# Patient Record
Sex: MALE | Race: WHITE | NOT HISPANIC OR LATINO | URBAN - METROPOLITAN AREA
[De-identification: names, ages, dates, MRNs, and addresses within clinical notes are randomized per-mention and may not be internally consistent; named-entity substitution may affect disease eponyms.]

---

## 2022-09-08 ENCOUNTER — INPATIENT (INPATIENT)
Facility: HOSPITAL | Age: 67
LOS: 0 days | Discharge: LEFT BEFORE DISCHARGE | DRG: 69 | End: 2022-09-09
Attending: PSYCHIATRY & NEUROLOGY | Admitting: PSYCHIATRY & NEUROLOGY
Payer: MEDICARE

## 2022-09-08 VITALS
RESPIRATION RATE: 16 BRPM | DIASTOLIC BLOOD PRESSURE: 97 MMHG | WEIGHT: 152.56 LBS | HEART RATE: 96 BPM | TEMPERATURE: 98 F | OXYGEN SATURATION: 97 % | SYSTOLIC BLOOD PRESSURE: 172 MMHG

## 2022-09-08 LAB
ALBUMIN SERPL ELPH-MCNC: 4.5 G/DL — SIGNIFICANT CHANGE UP (ref 3.3–5)
ALP SERPL-CCNC: 101 U/L — SIGNIFICANT CHANGE UP (ref 40–120)
ALT FLD-CCNC: 27 U/L — SIGNIFICANT CHANGE UP (ref 10–45)
ANION GAP SERPL CALC-SCNC: 16 MMOL/L — SIGNIFICANT CHANGE UP (ref 5–17)
APPEARANCE UR: CLEAR — SIGNIFICANT CHANGE UP
APTT BLD: 32.9 SEC — SIGNIFICANT CHANGE UP (ref 27.5–35.5)
AST SERPL-CCNC: 24 U/L — SIGNIFICANT CHANGE UP (ref 10–40)
BASOPHILS # BLD AUTO: 0.06 K/UL — SIGNIFICANT CHANGE UP (ref 0–0.2)
BASOPHILS NFR BLD AUTO: 1.1 % — SIGNIFICANT CHANGE UP (ref 0–2)
BILIRUB SERPL-MCNC: 0.4 MG/DL — SIGNIFICANT CHANGE UP (ref 0.2–1.2)
BILIRUB UR-MCNC: NEGATIVE — SIGNIFICANT CHANGE UP
BUN SERPL-MCNC: 17 MG/DL — SIGNIFICANT CHANGE UP (ref 7–23)
CALCIUM SERPL-MCNC: 9.7 MG/DL — SIGNIFICANT CHANGE UP (ref 8.4–10.5)
CHLORIDE SERPL-SCNC: 106 MMOL/L — SIGNIFICANT CHANGE UP (ref 96–108)
CO2 SERPL-SCNC: 21 MMOL/L — LOW (ref 22–31)
COLOR SPEC: YELLOW — SIGNIFICANT CHANGE UP
CREAT SERPL-MCNC: 0.9 MG/DL — SIGNIFICANT CHANGE UP (ref 0.5–1.3)
DIFF PNL FLD: NEGATIVE — SIGNIFICANT CHANGE UP
EGFR: 92 ML/MIN/1.73M2 — SIGNIFICANT CHANGE UP
EOSINOPHIL # BLD AUTO: 0.32 K/UL — SIGNIFICANT CHANGE UP (ref 0–0.5)
EOSINOPHIL NFR BLD AUTO: 6.1 % — HIGH (ref 0–6)
GLUCOSE SERPL-MCNC: 228 MG/DL — HIGH (ref 70–99)
GLUCOSE UR QL: >=1000
HCT VFR BLD CALC: 47.3 % — SIGNIFICANT CHANGE UP (ref 39–50)
HGB BLD-MCNC: 16 G/DL — SIGNIFICANT CHANGE UP (ref 13–17)
IMM GRANULOCYTES NFR BLD AUTO: 0.2 % — SIGNIFICANT CHANGE UP (ref 0–1.5)
INR BLD: 0.97 — SIGNIFICANT CHANGE UP (ref 0.88–1.16)
KETONES UR-MCNC: NEGATIVE — SIGNIFICANT CHANGE UP
LEUKOCYTE ESTERASE UR-ACNC: NEGATIVE — SIGNIFICANT CHANGE UP
LYMPHOCYTES # BLD AUTO: 1.38 K/UL — SIGNIFICANT CHANGE UP (ref 1–3.3)
LYMPHOCYTES # BLD AUTO: 26.4 % — SIGNIFICANT CHANGE UP (ref 13–44)
MCHC RBC-ENTMCNC: 30.3 PG — SIGNIFICANT CHANGE UP (ref 27–34)
MCHC RBC-ENTMCNC: 33.8 GM/DL — SIGNIFICANT CHANGE UP (ref 32–36)
MCV RBC AUTO: 89.6 FL — SIGNIFICANT CHANGE UP (ref 80–100)
MONOCYTES # BLD AUTO: 0.3 K/UL — SIGNIFICANT CHANGE UP (ref 0–0.9)
MONOCYTES NFR BLD AUTO: 5.7 % — SIGNIFICANT CHANGE UP (ref 2–14)
NEUTROPHILS # BLD AUTO: 3.15 K/UL — SIGNIFICANT CHANGE UP (ref 1.8–7.4)
NEUTROPHILS NFR BLD AUTO: 60.5 % — SIGNIFICANT CHANGE UP (ref 43–77)
NITRITE UR-MCNC: NEGATIVE — SIGNIFICANT CHANGE UP
NRBC # BLD: 0 /100 WBCS — SIGNIFICANT CHANGE UP (ref 0–0)
PH UR: 7 — SIGNIFICANT CHANGE UP (ref 5–8)
PLATELET # BLD AUTO: 305 K/UL — SIGNIFICANT CHANGE UP (ref 150–400)
POTASSIUM SERPL-MCNC: 3.4 MMOL/L — LOW (ref 3.5–5.3)
POTASSIUM SERPL-SCNC: 3.4 MMOL/L — LOW (ref 3.5–5.3)
PROT SERPL-MCNC: 7.1 G/DL — SIGNIFICANT CHANGE UP (ref 6–8.3)
PROT UR-MCNC: NEGATIVE MG/DL — SIGNIFICANT CHANGE UP
PROTHROM AB SERPL-ACNC: 11.5 SEC — SIGNIFICANT CHANGE UP (ref 10.5–13.4)
RBC # BLD: 5.28 M/UL — SIGNIFICANT CHANGE UP (ref 4.2–5.8)
RBC # FLD: 13 % — SIGNIFICANT CHANGE UP (ref 10.3–14.5)
SARS-COV-2 RNA SPEC QL NAA+PROBE: NEGATIVE — SIGNIFICANT CHANGE UP
SODIUM SERPL-SCNC: 143 MMOL/L — SIGNIFICANT CHANGE UP (ref 135–145)
SP GR SPEC: 1.01 — SIGNIFICANT CHANGE UP (ref 1–1.03)
TROPONIN T SERPL-MCNC: 0.01 NG/ML — SIGNIFICANT CHANGE UP (ref 0–0.01)
UROBILINOGEN FLD QL: 0.2 E.U./DL — SIGNIFICANT CHANGE UP
WBC # BLD: 5.22 K/UL — SIGNIFICANT CHANGE UP (ref 3.8–10.5)
WBC # FLD AUTO: 5.22 K/UL — SIGNIFICANT CHANGE UP (ref 3.8–10.5)

## 2022-09-08 PROCEDURE — 0042T: CPT | Mod: MA

## 2022-09-08 PROCEDURE — 70498 CT ANGIOGRAPHY NECK: CPT | Mod: 26,MA

## 2022-09-08 PROCEDURE — 70496 CT ANGIOGRAPHY HEAD: CPT | Mod: 26,MA

## 2022-09-08 PROCEDURE — 99285 EMERGENCY DEPT VISIT HI MDM: CPT

## 2022-09-08 PROCEDURE — 99238 HOSP IP/OBS DSCHRG MGMT 30/<: CPT

## 2022-09-08 RX ORDER — ATORVASTATIN CALCIUM 80 MG/1
80 TABLET, FILM COATED ORAL AT BEDTIME
Refills: 0 | Status: DISCONTINUED | OUTPATIENT
Start: 2022-09-08 | End: 2022-09-09

## 2022-09-08 RX ORDER — ASPIRIN/CALCIUM CARB/MAGNESIUM 324 MG
325 TABLET ORAL ONCE
Refills: 0 | Status: COMPLETED | OUTPATIENT
Start: 2022-09-08 | End: 2022-09-08

## 2022-09-08 RX ORDER — ASPIRIN/CALCIUM CARB/MAGNESIUM 324 MG
81 TABLET ORAL DAILY
Refills: 0 | Status: DISCONTINUED | OUTPATIENT
Start: 2022-09-09 | End: 2022-09-09

## 2022-09-08 RX ORDER — CLOPIDOGREL BISULFATE 75 MG/1
75 TABLET, FILM COATED ORAL DAILY
Refills: 0 | Status: DISCONTINUED | OUTPATIENT
Start: 2022-09-09 | End: 2022-09-09

## 2022-09-08 RX ORDER — ENOXAPARIN SODIUM 100 MG/ML
40 INJECTION SUBCUTANEOUS EVERY 24 HOURS
Refills: 0 | Status: DISCONTINUED | OUTPATIENT
Start: 2022-09-08 | End: 2022-09-09

## 2022-09-08 RX ORDER — CLOPIDOGREL BISULFATE 75 MG/1
300 TABLET, FILM COATED ORAL ONCE
Refills: 0 | Status: COMPLETED | OUTPATIENT
Start: 2022-09-08 | End: 2022-09-08

## 2022-09-08 RX ORDER — POTASSIUM CHLORIDE 20 MEQ
40 PACKET (EA) ORAL ONCE
Refills: 0 | Status: COMPLETED | OUTPATIENT
Start: 2022-09-08 | End: 2022-09-08

## 2022-09-08 RX ADMIN — Medication 325 MILLIGRAM(S): at 20:59

## 2022-09-08 RX ADMIN — CLOPIDOGREL BISULFATE 300 MILLIGRAM(S): 75 TABLET, FILM COATED ORAL at 21:00

## 2022-09-08 RX ADMIN — Medication 40 MILLIEQUIVALENT(S): at 21:56

## 2022-09-08 RX ADMIN — ENOXAPARIN SODIUM 40 MILLIGRAM(S): 100 INJECTION SUBCUTANEOUS at 21:55

## 2022-09-08 NOTE — H&P ADULT - HISTORY OF PRESENT ILLNESS
**STROKE HPI***    HPI: 67y Male with PMHx of HTN, HLD, ?prediabetic, presenting with transient episodes of slurred speech, dizziness associated with disequilibrium, and left facial droop. Patient reports that at approximately 10:30AM, his speech became slurred and he also felt dizzy causing him to almost fall forward; symptoms then resolved. He had 3 more episodes of slurred speech, the last occurrence associated again with dizziness which prompted him to present to [facility] ED. As per grandson, patient also had left facial drooping throughout the day. Upon arrival to [facility] ED, slurred speech and dizziness had resolved but he has mild left nasolabial fold flattening, NIHSS 1 and ABCD2 score of 4. NCHCT negative for hemorrhage or transcortical infarction. CT perfusion negative. CTA head and neck negative for hemodynamically significant large vessel stenosis or occlusion, however, there is luminal irregularity of the posterior basilar artery. BP noted to be elevated upon arrival, 172/97 and patient reports being compliant with his blood pressure medications. Due to concern to TIA vs. CVA, patient admitted to stroke/tele unit for further work up and management.     PAST MEDICAL & SURGICAL HISTORY:  HTN  HLD  Prediabetes    T(C): 36.7 (09-08-22 @ 21:41), Max: 36.8 (09-08-22 @ 19:57)  HR: 84 (09-08-22 @ 21:41) (84 - 96)  BP: 150/69 (09-08-22 @ 21:41) (150/69 - 172/97)  RR: 18 (09-08-22 @ 21:41) (16 - 18)  SpO2: 98% (09-08-22 @ 21:41) (97% - 98%)    MEDICATION RECONCILIATION   MEDICATIONS  (STANDING):  atorvastatin 80 milliGRAM(s) Oral at bedtime  enoxaparin Injectable 40 milliGRAM(s) SubCutaneous every 24 hours  potassium chloride   Solution 40 milliEquivalent(s) Oral once    MEDICATIONS  (PRN):    Allergies    No Known Allergies    Intolerances      Vital Signs Last 24 Hrs  T(C): 36.7 (08 Sep 2022 21:41), Max: 36.8 (08 Sep 2022 19:57)  T(F): 98 (08 Sep 2022 21:41), Max: 98.2 (08 Sep 2022 19:57)  HR: 84 (08 Sep 2022 21:41) (84 - 96)  BP: 150/69 (08 Sep 2022 21:41) (150/69 - 172/97)  BP(mean): --  RR: 18 (08 Sep 2022 21:41) (16 - 18)  SpO2: 98% (08 Sep 2022 21:41) (97% - 98%)    Parameters below as of 08 Sep 2022 21:41  Patient On (Oxygen Delivery Method): room air

## 2022-09-08 NOTE — ED ADULT TRIAGE NOTE - MODE OF ARRIVAL
[facility]  [address] [place] [address]  [phone]      September 27, 2019    Juan Pablo Abraham                                                                                                                     [address] [address]            Dear Juan Pablo,    Dear Juan Pablo Abraham,    At [facility] we care about your health and well-being. A review of your chart has indicated that you are due for a diabetes check and labs. Please contact us at [phone] to schedule your appointment.    If you have already had one or all of the above screening tests at another facility, please call us to update your chart.     You may contact the clinic at [phone] if you have any questions or concerns about this request.    Sincerely,    [facility] Care Staff/ ss        Sincerely,       [facility] Care Staff/ ss     
Walk in

## 2022-09-08 NOTE — ED PROVIDER NOTE - OBJECTIVE STATEMENT
66 yo male with a hx of HTN and HLD p/w slurred speech and L facial droop. Last known normal 10:30am. Denies headache, visual disturbance, chest pain, back pain, syncope, palpitations, abdominal pain, n/v/d/c, dizziness, extremity weakness, numbness, tingling. Reports mild gait instability. No prior strokes. Not on blood thinners. No head trauma or injuries.

## 2022-09-08 NOTE — ED PROVIDER NOTE - PHYSICAL EXAMINATION
VITAL SIGNS: I have reviewed nursing notes and confirm.  CONSTITUTIONAL: Well appearing, in no acute distress.   SKIN:  warm and dry, no acute rash.   HEAD:  normocephalic, atraumatic.  EYES: EOM intact; conjunctiva and sclera clear.  ENT: No nasal discharge; airway clear.   NECK: Supple; non tender.  CARD: S1, S2 normal; no murmurs, gallops, or rubs. Regular rate and rhythm.   RESP:  Clear to auscultation b/l, no wheezes, rales or rhonchi.  ABD: Normal bowel sounds; soft; non-distended; non-tender; no guarding/ rebound.  EXT: Normal ROM. No clubbing, cyanosis or edema. 2+ pulses to b/l ue/le.  NEURO: Alert, oriented, L facial droop without forehead involvement. Strength 5/5 in all extremities, no sensory deficits, normal gait and coordination   PSYCH: Cooperative, mood and affect appropriate.

## 2022-09-08 NOTE — ED PROVIDER NOTE - CLINICAL SUMMARY MEDICAL DECISION MAKING FREE TEXT BOX
68 yo male with a hx of HTN and HLD p/w slurred speech and L facial droop. Hypertensive. +L facial droop without forehead involvement. Fingerstick wnl. Stroke code activated at triage.

## 2022-09-08 NOTE — ED ADULT TRIAGE NOTE - CHIEF COMPLAINT QUOTE
Pt states around 1030 this morning, he noticed he "could not get the words out" and had unsteady gait and had to sit down. Symptoms resolved on their own and then the dizziness and facial droop returned about an hour ago. Pt denies headache, numbness/tingling, vision changes.

## 2022-09-08 NOTE — H&P ADULT - NSHPREVIEWOFSYSTEMS_GEN_ALL_CORE
ROS:  Constitutional: No fever, weight loss or fatigue  Eyes: No eye pain, visual disturbances, or discharge  ENMT:  No difficulty hearing, tinnitus, vertigo; No sinus or throat pain  Neck: No pain or stiffness  Respiratory: No cough, wheezing, chills or hemoptysis  Cardiovascular: No chest pain, palpitations, shortness of breath, dizziness or leg swelling  Gastrointestinal: No abdominal pain. No nausea, vomiting or hematemesis; No diarrhea or constipation. Nohematochezia.  Genitourinary: No dysuria, frequency, hematuria or incontinence  Neurological: As per HPI  Skin: No itching, burning, rashes or lesions   Endocrine: No heat or cold intolerance; No hair loss  Musculoskeletal: No joint pain or swelling; No muscle, back or extremity pain  Psychiatric: No depression, anxiety, mood swings or difficulty sleeping  Heme/Lymph: No easy bruising or bleeding gums

## 2022-09-08 NOTE — H&P ADULT - NSHPSOURCEINFOTX_GEN_ALL_CORE
Problem: Adult Inpatient Plan of Care  Goal: Plan of Care Review  Outcome: Ongoing, Progressing  Goal: Patient-Specific Goal (Individualized)  Outcome: Ongoing, Progressing  Goal: Absence of Hospital-Acquired Illness or Injury  Outcome: Ongoing, Progressing  Goal: Optimal Comfort and Wellbeing  Outcome: Ongoing, Progressing  Goal: Readiness for Transition of Care  Outcome: Ongoing, Progressing     Problem: Diabetes Comorbidity  Goal: Blood Glucose Level Within Targeted Range  Outcome: Ongoing, Progressing     Problem: Fluid and Electrolyte Imbalance (Acute Kidney Injury/Impairment)  Goal: Fluid and Electrolyte Balance  Outcome: Ongoing, Progressing      Grandson

## 2022-09-08 NOTE — ED ADULT NURSE NOTE - OBJECTIVE STATEMENT
67y male presents to ED c/o slurred speech and facial droop. Pt states around 1030, pt noted to have facial droop and slurred speech which resolved. Pt hand new episode of similar symptoms and presented to ED. Minor left sided facial droop noted. No slurred speech noted during exam.  Pt denies blurry vision, headache, dizziness. No blood thinner use. A&Ox4.

## 2022-09-08 NOTE — H&P ADULT - NSHPLABSRESULTS_GEN_ALL_CORE
Fingerstick Blood Glucose: CAPILLARY BLOOD GLUCOSE      POCT Blood Glucose.: 205 mg/dL (08 Sep 2022 19:50)    LABS:                        16.0   5.22  )-----------( 305      ( 08 Sep 2022 20:10 )             47.3         143  |  106  |  17  ----------------------------<  228<H>  3.4<L>   |  21<L>  |  0.90    Ca    9.7      08 Sep 2022 20:10    TPro  7.1  /  Alb  4.5  /  TBili  0.4  /  DBili  x   /  AST  24  /  ALT  27  /  AlkPhos  101      PT/INR - ( 08 Sep 2022 20:10 )   PT: 11.5 sec;   INR: 0.97          PTT - ( 08 Sep 2022 20:10 )  PTT:32.9 sec  CARDIAC MARKERS ( 08 Sep 2022 20:10 )  x     / 0.01 ng/mL / x     / x     / x          Urinalysis Basic - ( 08 Sep 2022 20:26 )    Color: Yellow / Appearance: Clear / S.010 / pH: x  Gluc: x / Ketone: NEGATIVE  / Bili: Negative / Urobili: 0.2 E.U./dL   Blood: x / Protein: NEGATIVE mg/dL / Nitrite: NEGATIVE   Leuk Esterase: NEGATIVE / RBC: x / WBC x   Sq Epi: x / Non Sq Epi: x / Bacteria: x    < from: CT Brain Stroke Protocol (22 @ 20:03) >      IMPRESSION: No acute transcortical infarction or hemorrhage.    < end of copied text >    < from: CT Angio Neck Stroke Protocol w/ IV Cont (22 @ 20:18) >    IMPRESSION:    No hemodynamically significant large vessel stenosis or occlusion in the   head and neck.    < end of copied text >    < from: CT Angio Neck Stroke Protocol w/ IV Cont (22 @ 20:18) >    Other:  A 0.9 cm right thyroid nodule is present. There are severe degenerative   changes of the cervical spine.    < end of copied text >    < from: CT Brain Perfusion Maps Stroke (22 @ 20:26) >      IMPRESSION: Normal CT perfusion study.    < end of copied text >

## 2022-09-08 NOTE — H&P ADULT - ASSESSMENT
67y Male with PMHx of HTN, HLD, ?prediabetic, presenting with transient episodes x4 of slurred speech, dizziness associated with disequilibrium, and left facial droop. NIHSS 1 for mild LNFF, other symptoms have resolved. CT, CTA, and CTP unremarkable. Due to concern for TIA vs. CVA, patient admitted to stroke/tele for further work up and management.    Neuro  #CVA workup  - s/p ASA 325mg and Plavix 300mg load in ED  - continue aspirin 81mg and plavix 75mg daily starting 9/9  - initiated atorvastatin 80mg daily  - q4hr stroke neuro checks and vitals  - obtain MRI Brain without contrast  - Stroke Code HCT Results: negative  - Stroke Code CTA Results: negative for steno-occlusive disease  - Stroke education    Cards  #HTN  - permissive hypertension, Goal -180  - hold home blood pressure medication for now  - obtain TTE with bubble  - Stroke Code EKG Results:    #HLD  - high dose statin as above in CVA  - LDL results: pending    Pulm  - call provider if SPO2 < 94%    GI  #Nutrition/Fluids/Electrolytes   - replete K<4 and Mg <2  - Diet: Carb controlled    Renal  - daily BMP    Infectious Disease  - Stroke Code CXR results:     Endocrine  #? new DM diagnosis - patient with serum glucose in 200's on admission and glucosuria   - A1C results: pending  - started ac/hs fingersticks    #incidential finding of 0.9cm right thyroid nodule seen on CTA neck   - TSH results: pending  - consider thyroid ultrasound?    DVT Prophylaxis  - lovenox sq for DVT prophylaxis   - SCDs for DVT prophylaxis     Dispo: pending PT/OT     Discussed daily hospital plans and goals with patient and family at bedside.     Discussed with Neurology Attending Dr. Cosby and Fellow Dr. Sanchez

## 2022-09-08 NOTE — ED ADULT NURSE NOTE - NSIMPLEMENTINTERV_GEN_ALL_ED
Implemented All Universal Safety Interventions:  [place] to call system. Call bell, personal items and telephone within reach. Instruct patient to call for assistance. Room bathroom lighting operational. Non-slip footwear when patient is off stretcher. Physically safe environment: no spills, clutter or unnecessary equipment. Stretcher in lowest position, wheels locked, appropriate side rails in place.

## 2022-09-08 NOTE — H&P ADULT - NSHPADDITIONALINFOADULT_GEN_ALL_CORE
Vascular Neurology Fellow Attestation:  Patient seen and examined with stroke team and agree with above.  68yo man with recurrent transient dysarthria and unsteady gait with questionable associated left facial droop concerning for high risk TIA. MRI negative for acute stroke. s/p TTE with results pending. Started on DAPT per [facility] protocol. Recommend continued tele monitoring for 48-72 hours and routine EEG given episodic stereotyped nature of symptoms.     Discussed with attending, Dr. Cosby

## 2022-09-08 NOTE — H&P ADULT - NSHPPHYSICALEXAM_GEN_ALL_CORE
Physical exam:  General: No acute distress, awake and alert  Cardiovascular: Regular rate and rhythm, no murmurs, rubs, or gallops. No bruits  Pulmonary: Anterior breath sounds clear bilaterally, no crackles or wheezing. No use of accessory muscles  GI: Abdomen soft, non-tender, non-distended    Neurologic:  -Mental status: Awake, alert, oriented to person, place, and time. Speech is fluent with intact naming, repetition, and comprehension, no dysarthria. Recent and remote memory intact. Follows commands. Attention/concentration intact. Fund of knowledge appropriate.  -Cranial nerves:   II: Visual fields are full to confrontation.  III, IV, VI: Extraocular movements are intact without nystagmus. Pupils equally round and reactive to light  V:  Facial sensation V1-V3 equal and intact   VII: Mild L NLFF  Motor: Normal bulk and tone. No pronator drift. Strength bilateral upper extremity 5/5, bilateral lower extremities 5/5.  Sensation: Intact to light touch bilaterally. No neglect or extinction on double simultaneous testing.  Coordination: No dysmetria on finger-to-nose and heel-to-shin bilaterally  Gait: Narrow gait and steady    NIHSS: 1

## 2022-09-09 ENCOUNTER — TRANSCRIPTION ENCOUNTER (OUTPATIENT)
Age: 67
End: 2022-09-09

## 2022-09-09 VITALS
HEART RATE: 98 BPM | OXYGEN SATURATION: 97 % | DIASTOLIC BLOOD PRESSURE: 98 MMHG | RESPIRATION RATE: 17 BRPM | SYSTOLIC BLOOD PRESSURE: 160 MMHG

## 2022-09-09 DIAGNOSIS — R42 DIZZINESS AND GIDDINESS: ICD-10-CM

## 2022-09-09 DIAGNOSIS — E04.1 NONTOXIC SINGLE THYROID NODULE: ICD-10-CM

## 2022-09-09 DIAGNOSIS — I10 ESSENTIAL (PRIMARY) HYPERTENSION: ICD-10-CM

## 2022-09-09 DIAGNOSIS — E11.9 TYPE 2 DIABETES MELLITUS WITHOUT COMPLICATIONS: ICD-10-CM

## 2022-09-09 DIAGNOSIS — E87.6 HYPOKALEMIA: ICD-10-CM

## 2022-09-09 LAB
A1C WITH ESTIMATED AVERAGE GLUCOSE RESULT: 7.1 % — HIGH (ref 4–5.6)
ANION GAP SERPL CALC-SCNC: 11 MMOL/L — SIGNIFICANT CHANGE UP (ref 5–17)
BUN SERPL-MCNC: 14 MG/DL — SIGNIFICANT CHANGE UP (ref 7–23)
CALCIUM SERPL-MCNC: 8.8 MG/DL — SIGNIFICANT CHANGE UP (ref 8.4–10.5)
CHLORIDE SERPL-SCNC: 109 MMOL/L — HIGH (ref 96–108)
CHOLEST SERPL-MCNC: 194 MG/DL — SIGNIFICANT CHANGE UP
CO2 SERPL-SCNC: 25 MMOL/L — SIGNIFICANT CHANGE UP (ref 22–31)
CREAT SERPL-MCNC: 0.83 MG/DL — SIGNIFICANT CHANGE UP (ref 0.5–1.3)
EGFR: 95 ML/MIN/1.73M2 — SIGNIFICANT CHANGE UP
ESTIMATED AVERAGE GLUCOSE: 157 MG/DL — HIGH (ref 68–114)
GLUCOSE BLDC GLUCOMTR-MCNC: 133 MG/DL — HIGH (ref 70–99)
GLUCOSE SERPL-MCNC: 148 MG/DL — HIGH (ref 70–99)
HCT VFR BLD CALC: 45.1 % — SIGNIFICANT CHANGE UP (ref 39–50)
HCV AB S/CO SERPL IA: 0.03 S/CO — SIGNIFICANT CHANGE UP
HCV AB SERPL-IMP: SIGNIFICANT CHANGE UP
HDLC SERPL-MCNC: 34 MG/DL — LOW
HGB BLD-MCNC: 15 G/DL — SIGNIFICANT CHANGE UP (ref 13–17)
LIPID PNL WITH DIRECT LDL SERPL: 113 MG/DL — HIGH
MAGNESIUM SERPL-MCNC: 2.2 MG/DL — SIGNIFICANT CHANGE UP (ref 1.6–2.6)
MCHC RBC-ENTMCNC: 30.1 PG — SIGNIFICANT CHANGE UP (ref 27–34)
MCHC RBC-ENTMCNC: 33.3 GM/DL — SIGNIFICANT CHANGE UP (ref 32–36)
MCV RBC AUTO: 90.4 FL — SIGNIFICANT CHANGE UP (ref 80–100)
NON HDL CHOLESTEROL: 160 MG/DL — HIGH
NRBC # BLD: 0 /100 WBCS — SIGNIFICANT CHANGE UP (ref 0–0)
PHOSPHATE SERPL-MCNC: 2.8 MG/DL — SIGNIFICANT CHANGE UP (ref 2.5–4.5)
PLATELET # BLD AUTO: 277 K/UL — SIGNIFICANT CHANGE UP (ref 150–400)
POTASSIUM SERPL-MCNC: 3.5 MMOL/L — SIGNIFICANT CHANGE UP (ref 3.5–5.3)
POTASSIUM SERPL-SCNC: 3.5 MMOL/L — SIGNIFICANT CHANGE UP (ref 3.5–5.3)
RBC # BLD: 4.99 M/UL — SIGNIFICANT CHANGE UP (ref 4.2–5.8)
RBC # FLD: 13.1 % — SIGNIFICANT CHANGE UP (ref 10.3–14.5)
SODIUM SERPL-SCNC: 145 MMOL/L — SIGNIFICANT CHANGE UP (ref 135–145)
TRIGL SERPL-MCNC: 237 MG/DL — HIGH
TSH SERPL-MCNC: 1.46 UIU/ML — SIGNIFICANT CHANGE UP (ref 0.27–4.2)
WBC # BLD: 4.86 K/UL — SIGNIFICANT CHANGE UP (ref 3.8–10.5)
WBC # FLD AUTO: 4.86 K/UL — SIGNIFICANT CHANGE UP (ref 3.8–10.5)

## 2022-09-09 PROCEDURE — 76376 3D RENDER W/INTRP POSTPROCES: CPT | Mod: 26

## 2022-09-09 PROCEDURE — 70551 MRI BRAIN STEM W/O DYE: CPT | Mod: 26

## 2022-09-09 PROCEDURE — 99221 1ST HOSP IP/OBS SF/LOW 40: CPT

## 2022-09-09 PROCEDURE — 93306 TTE W/DOPPLER COMPLETE: CPT | Mod: 26

## 2022-09-09 RX ORDER — ATORVASTATIN CALCIUM 80 MG/1
1 TABLET, FILM COATED ORAL
Qty: 0 | Refills: 0 | DISCHARGE
Start: 2022-09-09

## 2022-09-09 RX ORDER — LISINOPRIL 2.5 MG/1
1 TABLET ORAL
Qty: 0 | Refills: 0 | DISCHARGE

## 2022-09-09 RX ORDER — ATORVASTATIN CALCIUM 80 MG/1
1 TABLET, FILM COATED ORAL
Qty: 0 | Refills: 0 | DISCHARGE

## 2022-09-09 RX ORDER — ATORVASTATIN CALCIUM 80 MG/1
1 TABLET, FILM COATED ORAL
Qty: 30 | Refills: 1
Start: 2022-09-09 | End: 2022-11-07

## 2022-09-09 RX ORDER — ASPIRIN/CALCIUM CARB/MAGNESIUM 324 MG
1 TABLET ORAL
Qty: 30 | Refills: 1
Start: 2022-09-09 | End: 2022-11-07

## 2022-09-09 RX ORDER — METOPROLOL TARTRATE 50 MG
1 TABLET ORAL
Qty: 0 | Refills: 0 | DISCHARGE

## 2022-09-09 RX ORDER — LANOLIN ALCOHOL/MO/W.PET/CERES
3 CREAM (GRAM) TOPICAL AT BEDTIME
Refills: 0 | Status: DISCONTINUED | OUTPATIENT
Start: 2022-09-09 | End: 2022-09-09

## 2022-09-09 RX ORDER — AMLODIPINE BESYLATE 2.5 MG/1
1 TABLET ORAL
Qty: 0 | Refills: 0 | DISCHARGE

## 2022-09-09 RX ORDER — CLOPIDOGREL BISULFATE 75 MG/1
1 TABLET, FILM COATED ORAL
Qty: 21 | Refills: 0
Start: 2022-09-09 | End: 2022-09-29

## 2022-09-09 RX ADMIN — CLOPIDOGREL BISULFATE 75 MILLIGRAM(S): 75 TABLET, FILM COATED ORAL at 12:02

## 2022-09-09 RX ADMIN — Medication 81 MILLIGRAM(S): at 12:02

## 2022-09-09 RX ADMIN — Medication 3 MILLIGRAM(S): at 02:15

## 2022-09-09 NOTE — OCCUPATIONAL THERAPY INITIAL EVALUATION ADULT - MD ORDER
67y Male with PMHx of HTN, HLD, ?prediabetic, presenting with transient episodes x4 of slurred speech, dizziness associated with disequilibrium, and left facial droop. NIHSS 1 for mild LNFF, other symptoms have resolved. CT, CTA, and CTP unremarkable. Due to concern for TIA vs. CVA, patient admitted to stroke/tele for further work up and management.

## 2022-09-09 NOTE — OCCUPATIONAL THERAPY INITIAL EVALUATION ADULT - GENERAL OBSERVATIONS, REHAB EVAL
OT IE completed. MRS 0. Pt's MAURO Lowe cleared pt for therapy. Pt received semisupine in bed, +tele, +heplock, room air, NAD, agreeable to OT. Pt left seated in bedside chair, all lines in tact, needs in reach. MAURO Lowe aware.

## 2022-09-09 NOTE — OCCUPATIONAL THERAPY INITIAL EVALUATION ADULT - MODIFIED CLINICAL TEST OF SENSORY INTEGRATION IN BALANCE TEST
Pt performed functional mobility in hallway ~200ft with no device and independence. Pt navigated up/down 1FOS with R railing ascending/descending with no device and independence. No LOB noted.

## 2022-09-09 NOTE — DISCHARGE NOTE NURSING/CASE MANAGEMENT/SOCIAL WORK - NSDCPEFALRISK_GEN_ALL_CORE
For information on Fall & Injury Prevention, visit: [email or web address] OR  [email or web address] OR  https://www.cdc.gov/steadi/patient.html

## 2022-09-09 NOTE — OCCUPATIONAL THERAPY INITIAL EVALUATION ADULT - ADDITIONAL COMMENTS
Pt lives in a house with 6STE with a few group home residents (who pt reports he does not need to physically assist.). Pt reports there is 1FOS to navigate to bedroom/bathroom. Pt has walk-in shower. Pt reports that prior to admission, pt was independent in all ADLs and IADLs, and ambulates with no device. Pt is R hand dominant. Wears glasses for reading and distance.

## 2022-09-09 NOTE — DISCHARGE NOTE PROVIDER - NSDCQMSTROKERISK_NEU_ALL_CORE
Diabetes/High blood pressure/High cholesterol Diabetes/High blood pressure/High cholesterol/History of a stroke or TIA

## 2022-09-09 NOTE — OCCUPATIONAL THERAPY INITIAL EVALUATION ADULT - NSACTIVITYREC_GEN_A_OT
As pt is independent with ADLs, functional transfers, and functional mobility with no device, will d/c pt from inpatient OT services at this time.
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
## 2022-09-09 NOTE — CONSULT NOTE ADULT - PROBLEM SELECTOR RECOMMENDATION 9
a/w dysarthria and L facial droop, x multiple episodes; sx resolved; no e/o CVA on MRI, but sx may represent TIA; cont. work-up and mgmt per Neuro; PT/OT; plan for TTE; on DAPT + statin
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
## 2022-09-09 NOTE — DISCHARGE NOTE PROVIDER - NSDCMRMEDTOKEN_GEN_ALL_CORE_FT
amLODIPine 5 mg oral tablet: 1 tab(s) orally once a day  aspirin 81 mg oral delayed release tablet: 1 tab(s) orally once a day  atorvastatin 80 mg oral tablet: 1 tab(s) orally once a day (at bedtime)  clopidogrel 75 mg oral tablet: 1 tab(s) orally once a day  lisinopril 20 mg oral tablet: 1 tab(s) orally once a day  metoprolol tartrate 50 mg oral tablet: 1 tab(s) orally 2 times a day

## 2022-09-09 NOTE — CHART NOTE - NSCHARTNOTEFT_GEN_A_CORE
RDN consulted for diet education and counseling. New onset T2DM; reviewed [facility] DM handout -discussed disease and pathophysiology. Reviewed tests and monitoring tools for DM. Pt with HbA1c of 7.1%; reviewed common sources of carbohydrate in the diet. Educated simple vs. complex carbohydrates and encouraged increased fiber intake. Discussed importance of lean protein intake and general healthful exercise as able. Pt with no further questions. RDN name and contact information provided.
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
## 2022-09-09 NOTE — CONSULT NOTE ADULT - PROBLEM SELECTOR RECOMMENDATION 4
CTA neck shows a "0.9 cm right thyroid nodule;" TSH WNL; Pt. informed of finding; no need for further inpatient work-up, can consider thyroid U/S as outpatient

## 2022-09-09 NOTE — PATIENT PROFILE ADULT - FALL HARM RISK - HARM RISK INTERVENTIONS
Assistance with ambulation/Assistance OOB with selected safe patient handling equipment/Communicate Risk of Fall with Harm to all staff/Discuss with provider need for PT consult/Monitor gait and stability/Provide patient with walking aids - walker, cane, crutches/Reinforce activity limits and safety measures with patient and family/Sit up slowly, dangle for a short time, stand at bedside before walking/Tailored Fall Risk Interventions/Visual Cue: Yellow wristband and red socks/Bed in lowest position, wheels locked, appropriate side rails in place/Call bell, personal items and telephone in reach/Instruct patient to call for assistance before getting out of bed or chair/Non-slip footwear when patient is out of bed/[place] to call system/Physically safe environment - no spills, clutter or unnecessary equipment/Purposeful Proactive Rounding/Room/bathroom lighting operational, light cord in reach

## 2022-09-09 NOTE — PHYSICAL THERAPY INITIAL EVALUATION ADULT - GAIT PATTERN USED, PT EVAL
Pt ambulated in hallway on portable monitor, VSS throughout. Pt denied dizziness but reported feeling tired from not getting sleep last night./2-point gait

## 2022-09-09 NOTE — CONSULT NOTE ADULT - SUBJECTIVE AND OBJECTIVE BOX
Patient is a 67y old  Male who presents with a chief complaint of transient slurred speech and left facial droop (09 Sep 2022 09:17)    HPI:   **STROKE HPI***    HPI: 67y Male with PMHx of HTN, HLD, ?prediabetic, presenting with transient episodes of slurred speech, dizziness associated with disequilibrium, and left facial droop. Patient reports that at approximately 10:30AM, his speech became slurred and he also felt dizzy causing him to almost fall forward; symptoms then resolved. He had 3 more episodes of slurred speech, the last occurrence associated again with dizziness which prompted him to present to [facility] ED. As per grandson, patient also had left facial drooping throughout the day. Upon arrival to [facility] ED, slurred speech and dizziness had resolved but he has mild left nasolabial fold flattening, NIHSS 1 and ABCD2 score of 4. NCHCT negative for hemorrhage or transcortical infarction. CT perfusion negative. CTA head and neck negative for hemodynamically significant large vessel stenosis or occlusion, however, there is luminal irregularity of the posterior basilar artery. BP noted to be elevated upon arrival, 172/97 and patient reports being compliant with his blood pressure medications. Due to concern to TIA vs. CVA, patient admitted to stroke/tele unit for further work up and management.      (08 Sep 2022 21:46)    Review of Systems: 12 point review of systems otherwise negative    PAST MEDICAL & SURGICAL HISTORY:  HTN  HLD  Prediabetes    Social History:  SOCIAL HISTORY:   Patient lives with grandson (08 Sep 2022 21:46)    FAMILY HISTORY:  no reported h/o CVA in first-degree relatives      MEDICATIONS  (STANDING):  aspirin enteric coated 81 milliGRAM(s) Oral daily  atorvastatin 80 milliGRAM(s) Oral at bedtime  clopidogrel Tablet 75 milliGRAM(s) Oral daily  enoxaparin Injectable 40 milliGRAM(s) SubCutaneous every 24 hours  melatonin 3 milliGRAM(s) Oral at bedtime    MEDICATIONS  (PRN):      Allergies    No Known Allergies    Intolerances          Vital Signs Last 24 Hrs  T(C): 36.6 (09 Sep 2022 05:53), Max: 36.8 (08 Sep 2022 19:57)  T(F): 97.8 (09 Sep 2022 05:53), Max: 98.2 (08 Sep 2022 19:57)  HR: 98 (09 Sep 2022 10:00) (66 - 98)  BP: 169/93 (09 Sep 2022 10:00) (150/69 - 178/102)  BP(mean): 123 (09 Sep 2022 10:00) (114 - 134)  RR: 20 (09 Sep 2022 04:24) (16 - 20)  SpO2: 96% (09 Sep 2022 10:00) (96% - 98%)    Parameters below as of 09 Sep 2022 10:00  Patient On (Oxygen Delivery Method): room air      CAPILLARY BLOOD GLUCOSE      POCT Blood Glucose.: 133 mg/dL (09 Sep 2022 06:24)  POCT Blood Glucose.: 205 mg/dL (08 Sep 2022 19:50)        Physical Exam:  (earlier today)  Daily     Daily   General:  well-appearing in NAD  HEENT:  MMM  CV:  RRR, no JVD  Lungs:  CTA B/L  Abdomen:  soft NT ND  Extremities:  no edema B/L LE  Skin:  WWP  Neuro:  AAOx3, no dysarthria    LABS:                        15.0   4.86  )-----------( 277      ( 09 Sep 2022 06:38 )             45.1     09-    145  |  109<H>  |  14  ----------------------------<  148<H>  3.5   |  25  |  0.83    Ca    8.8      09 Sep 2022 06:38  Phos  2.8     09-  Mg     2.2     09-    TPro  7.1  /  Alb  4.5  /  TBili  0.4  /  DBili  x   /  AST  24  /  ALT  27  /  AlkPhos  101  09-08    PT/INR - ( 08 Sep 2022 20:10 )   PT: 11.5 sec;   INR: 0.97          PTT - ( 08 Sep 2022 20:10 )  PTT:32.9 sec  Urinalysis Basic - ( 08 Sep 2022 20:26 )    Color: Yellow / Appearance: Clear / S.010 / pH: x  Gluc: x / Ketone: NEGATIVE  / Bili: Negative / Urobili: 0.2 E.U./dL   Blood: x / Protein: NEGATIVE mg/dL / Nitrite: NEGATIVE   Leuk Esterase: NEGATIVE / RBC: x / WBC x   Sq Epi: x / Non Sq Epi: x / Bacteria: x    
    Patient is a 67y old  Male who presents with a chief complaint of transient slurred speech and left facial droop (08 Sep 2022 21:46)        HPI:   **STROKE HPI***    HPI: 67y Male with PMHx of HTN, HLD, ?prediabetic, presenting with transient episodes of slurred speech, dizziness associated with disequilibrium, and left facial droop. Patient reports that at approximately 10:30AM, his speech became slurred and he also felt dizzy causing him to almost fall forward; symptoms then resolved. He had 3 more episodes of slurred speech, the last occurrence associated again with dizziness which prompted him to present to [facility] ED. As per grandson, patient also had left facial drooping throughout the day. Upon arrival to [facility] ED, slurred speech and dizziness had resolved but he has mild left nasolabial fold flattening, NIHSS 1 and ABCD2 score of 4. NCHCT negative for hemorrhage or transcortical infarction. CT perfusion negative. CTA head and neck negative for hemodynamically significant large vessel stenosis or occlusion, however, there is luminal irregularity of the posterior basilar artery. BP noted to be elevated upon arrival, 172/97 and patient reports being compliant with his blood pressure medications. Due to concern to TIA vs. CVA, patient admitted to stroke/tele unit for further work up and management.     PAST MEDICAL & SURGICAL HISTORY:  HTN  HLD  Prediabetes    T(C): 36.7 (22 @ 21:41), Max: 36.8 (22 @ 19:57)  HR: 84 (22 @ 21:41) (84 - 96)  BP: 150/69 (22 @ 21:41) (150/69 - 172/97)  RR: 18 (22 @ 21:41) (16 - 18)  SpO2: 98% (22 @ 21:41) (97% - 98%)    MEDICATION RECONCILIATION   MEDICATIONS  (STANDING):  atorvastatin 80 milliGRAM(s) Oral at bedtime  enoxaparin Injectable 40 milliGRAM(s) SubCutaneous every 24 hours  potassium chloride   Solution 40 milliEquivalent(s) Oral once    MEDICATIONS  (PRN):    Allergies    No Known Allergies    Intolerances      Vital Signs Last 24 Hrs  T(C): 36.7 (08 Sep 2022 21:41), Max: 36.8 (08 Sep 2022 19:57)  T(F): 98 (08 Sep 2022 21:41), Max: 98.2 (08 Sep 2022 19:57)  HR: 84 (08 Sep 2022 21:41) (84 - 96)  BP: 150/69 (08 Sep 2022 21:41) (150/69 - 172/97)  BP(mean): --  RR: 18 (08 Sep 2022 21:41) (16 - 18)  SpO2: 98% (08 Sep 2022 21:41) (97% - 98%)    Parameters below as of 08 Sep 2022 21:41  Patient On (Oxygen Delivery Method): room air       (08 Sep 2022 21:46)      PAST MEDICAL & SURGICAL HISTORY:      MEDICATIONS  (STANDING):  aspirin enteric coated 81 milliGRAM(s) Oral daily  atorvastatin 80 milliGRAM(s) Oral at bedtime  clopidogrel Tablet 75 milliGRAM(s) Oral daily  enoxaparin Injectable 40 milliGRAM(s) SubCutaneous every 24 hours  melatonin 3 milliGRAM(s) Oral at bedtime    MEDICATIONS  (PRN):           FAMILY HISTORY:    CBC Full  -  ( 09 Sep 2022 06:38 )  WBC Count : 4.86 K/uL  RBC Count : 4.99 M/uL  Hemoglobin : 15.0 g/dL  Hematocrit : 45.1 %  Platelet Count - Automated : 277 K/uL  Mean Cell Volume : 90.4 fl  Mean Cell Hemoglobin : 30.1 pg  Mean Cell Hemoglobin Concentration : 33.3 gm/dL  Auto Neutrophil # : x  Auto Lymphocyte # : x  Auto Monocyte # : x  Auto Eosinophil # : x  Auto Basophil # : x  Auto Neutrophil % : x  Auto Lymphocyte % : x  Auto Monocyte % : x  Auto Eosinophil % : x  Auto Basophil % : x          145  |  109<H>  |  14  ----------------------------<  148<H>  3.5   |  25  |  0.83    Ca    8.8      09 Sep 2022 06:38  Phos  2.8       Mg     2.2         TPro  7.1  /  Alb  4.5  /  TBili  0.4  /  DBili  x   /  AST  24  /  ALT  27  /  AlkPhos  101        Urinalysis Basic - ( 08 Sep 2022 20:26 )    Color: Yellow / Appearance: Clear / S.010 / pH: x  Gluc: x / Ketone: NEGATIVE  / Bili: Negative / Urobili: 0.2 E.U./dL   Blood: x / Protein: NEGATIVE mg/dL / Nitrite: NEGATIVE   Leuk Esterase: NEGATIVE / RBC: x / WBC x   Sq Epi: x / Non Sq Epi: x / Bacteria: x          Radiology :    < from: CT Brain Stroke Protocol (22 @ 20:03) >  ACC: [id number] EXAM:  CT BRAIN STROKE PROTOCOL                          PROCEDURE DATE:  2022          INTERPRETATION:  PROCEDURE: CT head without intravenous contrast    INDICATION: transient slurred speech and dizziness    TECHNIQUE: Multiple axial images were obtained at 5 mm intervals from the   skull base to the vertex. Sagittal and coronal reformatted images were   obtained from the axial data set. The images were reviewed in brain and   bone windows.    COMPARISON: CT head dated 2012    FINDINGS: The CT examination demonstrates the ventricles, cisternal   spaces, and cortical sulci to be within normal limits. There is no   midline shift or extra axial collections. The gray white differentiation   appears within normal limits. There is no intracranial hemorrhage or   acute transcortical infarct. The calvarium is normal. The visualized   paranasal sinuses demonstrate no air-fluid levels. The mastoid air cells   are well aerated.    IMPRESSION: No acute transcortical infarction or hemorrhage.      Communication:  I discussed the finding of this report with Dr. Catalan at 8:04 PM on 2022.  Critical value policy of the hospital   was followed.  Read back and confirmation of receipt of this   communication was performed.  This verbal communication supplements the   text report of this document.      < from: CT Brain Perfusion Maps Stroke (22 @ 20:26) >  ACC: [id number] EXAM:  CT BRAIN PERFUSION MAPS STROKE                          PROCEDURE DATE:  2022          INTERPRETATION:  PROCEDURE: CT Perfusion with intravenous contrast    INDICATION: Stroke code. Transient slurred speech and dizziness.    TECHNIQUE: Following the intravenous administration of 40 mL of Isovue   370, serial axial images were obtained through the brain. The CT   perfusion data set [facility]   generating color maps of CBF, CBV, MTT, and Tmax.    COMPARISON: None    FINDINGS: The CT perfusion study demonstrates no perfusion abnormality or   mismatch volume.    CBF less than 30% volume: 0 mL  Tmax greater than 6 seconds volume: 0 mL  Mismatch volume: 0 mL  Mismatch ratio: None    IMPRESSION: Normal CT perfusion study.        < from: CT Angio Brain Stroke Protocol  w/ IV Cont (22 @ 20:25) >    ACC: [id number] EXAM:  CT ANGIO BRAIN STROKE PROTC IC                        ACC: [id number] EXAM:  CT ANGIO NECK STROKE PROTCL IC                          PROCEDURE DATE:  2022          INTERPRETATION:  CTA (CT angiogram) of the HEAD and NECK dated 2022   8:18 PM    CLINICAL STATEMENT: Stroke code. Transient slurred speech and dizziness.    TECHNIQUE: Following the administration of 80 cc Isovue 370 intravenous   contrast, CT angiograms of the head and neck were obtained. 3D image   postprocessing was performed with vascular and multi-planar reformats for   the evaluation of the arteries. All qualitative and quantitative   assessments of carotid bifurcation and proximal internal carotid artery   stenoses are made referencing the distal internal carotid artery.    COMPARISON: None.    FINDINGS:    CTA of the head:  The proximal aspects of anterior, middle, and posterior cerebral arteries   are patent. Mildly hypoplastic left A1 segment, duplicated anterior   communicating arteries, and fetal origin of the left posterior cerebral   artery are incidentally noted. The right V4 segment distal to the right   anterior inferior cerebellar artery origin is aplastic. The intracranial   vertebral and basilar arteries are otherwise patent. Luminal irregularity   of the posterior basilar artery is noted. No hemodynamically significant   stenosis or definite aneurysm is identified. There is a probable tiny   infundibulum arising from the posteroinferior aspect of the distal right   supraclinoid ICA.    CTA of the neck:  The vertebral arteries, common carotid arteries, and internal carotid   arteries demonstrate no hemodynamically significant stenosis, arterial   dissection, or aneurysm. The left vertebral artery is dominant.    Other:  A 0.9 cm right thyroid nodule is present. There are severe degenerative   changes of the cervical spine.      IMPRESSION:    No hemodynamically significant large vessel stenosis or occlusion in the   head and neck.                  Vital Signs Last 24 Hrs  T(C): 36.6 (09 Sep 2022 05:53), Max: 36.8 (08 Sep 2022 19:57)  T(F): 97.8 (09 Sep 2022 05:53), Max: 98.2 (08 Sep 2022 19:57)  HR: 78 (09 Sep 2022 08:35) (66 - 96)  BP: 156/94 (09 Sep 2022 08:35) (150/69 - 178/102)  BP(mean): 120 (09 Sep 2022 08:35) (114 - 134)  RR: 20 (09 Sep 2022 04:24) (16 - 20)  SpO2: 98% (09 Sep 2022 08:35) (97% - 98%)    Parameters below as of 09 Sep 2022 08:35  Patient On (Oxygen Delivery Method): room air            REVIEW OF SYSTEMS:      CONSTITUTIONAL: No fever, weight loss, or fatigue  EYES: No eye pain, visual disturbances, or discharge  ENMT:  No difficulty hearing, tinnitus, vertigo; No sinus or throat pain  NECK: No pain or stiffness  BREASTS: No pain, masses, or nipple discharge  RESPIRATORY: No cough, wheezing, chills or hemoptysis; No shortness of breath  CARDIOVASCULAR: No chest pain, palpitations, dizziness, or leg swelling  GASTROINTESTINAL: No abdominal or epigastric pain. No nausea, vomiting, or hematemesis; No diarrhea or constipation. No melena or hematochezia.  GENITOURINARY: No dysuria, frequency, hematuria, or incontinence  NEUROLOGICAL: per HPI   SKIN: No itching, burning, rashes, or lesions   LYMPH NODES: No enlarged glands  ENDOCRINE: No heat or cold intolerance; No hair loss  MUSCULOSKELETAL: No joint pain or swelling; No muscle, back, or extremity pain  PSYCHIATRIC: No depression, anxiety, mood swings, or difficulty sleeping  HEME/LYMPH: No easy bruising, or bleeding gums  ALLERGY AND IMMUNOLOGIC: No hives or eczema  VASCULAR: no swelling , erythema          Physical Exam:  67 y o man lying in semi Davis's position , awake , alert , no acute complaints    Head : normocephalic , atraumatic    Eyes : PERRLA , EOMI , no nystagmus , sclera anicteric    ENT : nasal discharge , uvula midline , no oropharyngeal erythema / exudate    Neck : supple , negative JVD , negative carotid bruits , no thyromegaly    Chest : CTA bilaterally , neg wheeze / rhonchi / rales / crackles / egophany    Cardiovascular: regular rate and rhythm , neg murmurs / rubs / gallops    Abdomen : soft , non distended , non tender to palpation in all 4 quadrants , negative rebound / guarding , normal bowel sounds    Extremities : WWP , neg cyanosis /clubbing / edema     Neurologic Exam:    Alert and oriented to person , place , date/year, speech fluent w/o dysarthria , follows commands , recent and remote memory intact , repetition intact , comprehension intact ,  attention/concentration intact , fund of knowledge appropriate    Cranial Nerves:     II :                         pupils equal , round and reactive to light , visual fields intact   III/ IV/VI :              extraocular movements intact , neg nystagmus , neg ptosis  V :                        facial sensation intact , V1-3 normal  VII :                     mild L NLF F , normal eye closure and smile  VIII :                     hearing intact to finger rub bilaterally  IX and X :             no hoarseness , gag intact , palate/ uvula rise symmetrically  XI :                       SCM / trapezius strength intact bilateral  XII :                      no tongue deviation    Motor Exam:    Right UE:                   : 5/5  wrist extensors/ flexors: 5/5  biceps :   5/5                    triceps :  5/5  deltoid :  5/5  negative pronator drift                               Left UE:                     : 5/5  wrist extensors/ flexors : 5/5  biceps :   5/5                    triceps :  5/5  deltoid :  5/5  negative pronator drift        Right LE:                   dorsiflexors :  5/5  plantar flexors :  5/5  quadriceps :  5/5  hamstrings :  5/5  hip flexors :  5/5    Left LE:                     dorsiflexors :  5/5  plantar flexors :  5/5  quadriceps :  5/5  hamstrings :  5/5  hip flexors :  5/5        Sensation:         intact to light touch x 4 extremities                         no neglect or extinction on double simultaneous testing                       DTR :                     biceps/brachioradialis : equal                                              patella/ankle : equal                                                                               neg Babinski       Coordination :      Finger to Nose :  neg dysmetria bilaterally                                   Heel to shin : wnl bilaterally                                  Gait :  not tested              PM&R Impression :     1) admitted for L facial droop / slurred speech / dizziness    2) CT brain imaging negative for acute pathology    Recommendations / Plan :     1) Physical / Occupational therapy focusing on therapeutic exercises , equipment evaluation , bed mobility/transfer out of bed evaluation , progressive ambulation with assistive devices prn .    2) Anticipated Disposition Plan / Recs  :   pending functional progress

## 2022-09-09 NOTE — OCCUPATIONAL THERAPY INITIAL EVALUATION ADULT - MANUAL MUSCLE TESTING RESULTS, REHAB EVAL
BUE shoulder flexion grossly 4+/5, BUE elbow flexion/extension 5/5, BUE  strength 5/5. See PT Ludivina's note for BLE MMT. RLE>LLE, bilaterally grossly greater than 3+/5..

## 2022-09-09 NOTE — CONSULT NOTE ADULT - ASSESSMENT
per Neurology    67 y o Male with PMHx of HTN, HLD, ?prediabetic, presenting with transient episodes x4 of slurred speech, dizziness associated with disequilibrium, and left facial droop. NIHSS 1 for mild LNFF, other symptoms have resolved. CT, CTA, and CTP unremarkable. Due to concern for TIA vs. CVA, patient admitted to stroke/tele for further work up and management.    Neuro  #CVA workup  - s/p ASA 325mg and Plavix 300mg load in ED  - continue aspirin 81mg and plavix 75mg daily starting 9/9  - initiated atorvastatin 80mg daily  - q4hr stroke neuro checks and vitals  - obtain MRI Brain without contrast  - Stroke Code HCT Results: negative  - Stroke Code CTA Results: negative for steno-occlusive disease  - Stroke education    Cards  #HTN  - permissive hypertension, Goal -180  - hold home blood pressure medication for now  - obtain TTE with bubble  - Stroke Code EKG Results:    #HLD  - high dose statin as above in CVA  - LDL results: pending    Pulm  - call provider if SPO2 < 94%    GI  #Nutrition/Fluids/Electrolytes   - replete K<4 and Mg <2  - Diet: Carb controlled    Renal  - daily BMP    Infectious Disease  - Stroke Code CXR results:     Endocrine  #? new DM diagnosis - patient with serum glucose in 200's on admission and glucosuria   - A1C results: pending  - started ac/hs fingersticks    #incidential finding of 0.9cm right thyroid nodule seen on CTA neck   - TSH results: pending  - consider thyroid ultrasound?    DVT Prophylaxis  - lovenox sq for DVT prophylaxis   - SCDs for DVT prophylaxis 
68 y/o M w/

## 2022-09-09 NOTE — OCCUPATIONAL THERAPY INITIAL EVALUATION ADULT - DIAGNOSIS, OT EVAL
Pt presents with no deficits impacting his ability to independently complete ADLs, functional transfers, and functional mobility.

## 2022-09-09 NOTE — DISCHARGE NOTE PROVIDER - HOSPITAL COURSE
67y Male with PMH HTN, HLD, newly diagnosed DM on this admission, presenting with transient episodes of slurred speech, dizziness associated with disequilibrium, and left facial droop. Patient reports that at approximately 10:30AM, his speech became slurred and he also felt dizzy causing him to almost fall forward; symptoms then resolved. He had 3 more episodes of slurred speech, the last occurrence associated again with dizziness which prompted him to present to [facility] ED. NIHSS 1 and ABCD2 score of 4. NCHCT negative for hemorrhage or transcortical infarction. CT perfusion negative. CTA head and neck negative for hemodynamically significant large vessel stenosis or occlusion, however, there is luminal irregularity of the posterior basilar artery. MRI negative for infarct. Echo with no PFO. Patient prefers to work on dietary/lifestlye changes before starting medication for DM. Patient declines ILR placement. He also defers inpatient EED, would prefer outpatient w/u. Risk of leaving explained to patient, including but not limited to generalized tonic clonic seizure and/or stroke leading to debilitating disability. Patient verbalized understanding; leaving AMA. Form signed and placed in chart.     During this hospital course, patient had no stroke on MRI.     Patient had the following workup done in house:  CT Head:   MR Head Non Contrast: No acute infarction, hemorrhage or mass effect.  CT Angio Head and Neck: No hemodynamically significant large vessel stenosis or occlusion in the   head and neck.  Echo:   1. Normal left and right ventricular size and systolic function, EF: 65%.   2. Injection of agitated saline via a peripheral vein reveals no   evidence of a right-to-left shunt.   3. Aortic sclerosis without significant stenosis.   4. No evidence of pulmonary hypertension.   5. No pericardial effusion.    Labs: A1c 7.1,       Physical exam at discharge:  -Mental status: Awake, alert, oriented to person, place, and time. Speech is fluent with intact naming, repetition, and comprehension, no dysarthria. Recent and remote memory intact. Follows commands. Attention/concentration intact. Fund of knowledge appropriate.  -Cranial nerves:   II: Visual fields are full to confrontation.  III, IV, VI: Extraocular movements are intact without nystagmus. Pupils equally round and reactive to light  V:  Facial sensation V1-V3 equal and intact   VII: Face is symmetric with normal eye closure and smile  VIII: Hearing is bilaterally intact   XII: Tongue protrudes midline  Motor: Normal bulk and tone. No pronator drift. Strength bilateral upper extremity 5/5, bilateral lower extremities 5/5.  Sensation: Intact to light touch bilaterally. No neglect or extinction on double simultaneous testing.  Coordination: No dysmetria on finger-to-nose and heel-to-shin bilaterally  Gait: Narrow gait and steady    NIHSS at discharge: 0  mRS at discharge: 0    New medications on discharge: aspirin 81mg, plavix 75mg daily, atorvastatin 80mg daily  Further outpatient workup: EEG, discuss ILR    67y Male with PMH HTN, HLD, newly diagnosed DM on this admission, presenting with transient episodes of slurred speech, dizziness associated with disequilibrium, and left facial droop. Patient reports that at approximately 10:30AM, his speech became slurred and he also felt dizzy causing him to almost fall forward; symptoms then resolved. He had 3 more episodes of slurred speech, the last occurrence associated again with dizziness which prompted him to present to [facility] ED. NIHSS 1 and ABCD2 score of 4. NCHCT negative for hemorrhage or transcortical infarction. CT perfusion negative. CTA head and neck negative for hemodynamically significant large vessel stenosis or occlusion, however, there is luminal irregularity of the posterior basilar artery. MRI negative for infarct. Echo with no PFO. Patient prefers to work on dietary/lifestlye changes before starting medication for DM. Patient declines ILR placement. He also defers inpatient EED, would prefer outpatient w/u. Risk of leaving explained to patient, including but not limited to generalized tonic clonic seizure and/or stroke leading to debilitating disability. Patient verbalized understanding; leaving AMA. Form signed and placed in chart.     During this hospital course, patient had no stroke on MRI.     Patient had the following workup done in house:  CT Head:   MR Head Non Contrast: No acute infarction, hemorrhage or mass effect.  CT Angio Head and Neck: No hemodynamically significant large vessel stenosis or occlusion in the   head and neck.  Echo:   1. Normal left and right ventricular size and systolic function, EF: 65%.   2. Injection of agitated saline via a peripheral vein reveals no   evidence of a right-to-left shunt.   3. Aortic sclerosis without significant stenosis.   4. No evidence of pulmonary hypertension.   5. No pericardial effusion.    Labs: A1c 7.1,       Physical exam at discharge:  -Mental status: Awake, alert, oriented to person, place, and time. Speech is fluent with intact naming, repetition, and comprehension, no dysarthria. Recent and remote memory intact. Follows commands. Attention/concentration intact. Fund of knowledge appropriate.  -Cranial nerves:   II: Visual fields are full to confrontation.  III, IV, VI: Extraocular movements are intact without nystagmus. Pupils equally round and reactive to light  V:  Facial sensation V1-V3 equal and intact   VII: Face is symmetric with normal eye closure and smile  VIII: Hearing is bilaterally intact   XII: Tongue protrudes midline  Motor: Normal bulk and tone. No pronator drift. Strength bilateral upper extremity 5/5, bilateral lower extremities 5/5.  Sensation: Intact to light touch bilaterally. No neglect or extinction on double simultaneous testing.  Coordination: No dysmetria on finger-to-nose and heel-to-shin bilaterally  Gait: Narrow gait and steady    NIHSS at discharge: 0  mRS at discharge: 0    New medications on discharge: aspirin 81mg, plavix 75mg daily, atorvastatin 80mg daily  Further outpatient workup: EEG, discuss ILR, MR NOVA
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
## 2022-09-09 NOTE — DISCHARGE NOTE PROVIDER - NSDCCPCAREPLAN_GEN_ALL_CORE_FT
PRINCIPAL DISCHARGE DIAGNOSIS  Diagnosis: Brain TIA  Assessment and Plan of Treatment: You were admitted to the hospital because you had symptoms of slurred speech, facial droop and unsteady gait, which resolved. This is called a transient ischemic attack, or TIA. This is when a blood clot temporarily blocks a blood vessel in your brain, but does not last long enough to cause permanent damage in your brain. A TIA is a warning sign of a future stroke, which can permanently damage areas in the brain that control parts of the body. It is important to treat a TIA to prevent strokes.   You have these risks factors of TIA and future strokes. Please see secondary diagnoses for further explanation:  -high blood pressure (also called hypertension)  -diabetes mellitus  -high cholesterol (also called hyperlipidemia)  Please take your aspirin and plavixfor blood thinning and Atorvastatin for cholesterol medication/blood vessel protection as prescribed to prevent further strokes. Do not skip doses and do not run low on your medication. If you run low on your medication, please contact your doctor.  You will follow up outpatient with the stroke Nurse Practitioner/doctor as scheduled below.  Call 911 if you or someone you know experiences the following symptoms of stroke (can be remembered by BE FAST):  •Balance: Dizziness, loss of balance, or a sense of falling  •Eyes: Sudden double vision or blurred vision  •Face: drooping of one side of the face  •Arm: arm weakness  •Speech:  Sudden trouble talking or slurred speech, trouble understanding others  •Time: Time to call for an ambulance fast!

## 2022-09-09 NOTE — DISCHARGE NOTE NURSING/CASE MANAGEMENT/SOCIAL WORK - NSDCFUADDAPPT_GEN_ALL_CORE_FT
Stroke clinic will call within 1 week to schedule a follow up appointment. You can also call us a [phone].     Please follow up with your primary care provider within 1-2 weeks after leaving the hospital.

## 2022-09-09 NOTE — DISCHARGE NOTE NURSING/CASE MANAGEMENT/SOCIAL WORK - PATIENT PORTAL LINK FT
You can access the FollowMyHealth Patient Portal offered by [facility] by registering at the following website: [email or web address]. By joining [employer]s FollowMyHealth portal, you will also be able to view your health information using other applications (apps) compatible with our system.

## 2022-09-09 NOTE — PHYSICAL THERAPY INITIAL EVALUATION ADULT - PERTINENT HX OF CURRENT PROBLEM, REHAB EVAL
66yo M with PMHx of HTN, HLD, ?prediabetic, presenting with transient episodes x4 of slurred speech, dizziness associated with disequilibrium, and left facial droop. NIHSS 1 for mild LNFF, other symptoms have resolved. CT, CTA, and CTP unremarkable. Due to concern for TIA vs. CVA, patient admitted to stroke/tele for further work up and management.

## 2022-09-13 DIAGNOSIS — Z20.822 CONTACT WITH AND (SUSPECTED) EXPOSURE TO COVID-19: ICD-10-CM

## 2022-09-13 DIAGNOSIS — R42 DIZZINESS AND GIDDINESS: ICD-10-CM

## 2022-09-13 DIAGNOSIS — G45.9 TRANSIENT CEREBRAL ISCHEMIC ATTACK, UNSPECIFIED: ICD-10-CM

## 2022-09-13 DIAGNOSIS — I10 ESSENTIAL (PRIMARY) HYPERTENSION: ICD-10-CM

## 2022-09-13 DIAGNOSIS — Z53.29 PROCEDURE AND TREATMENT NOT CARRIED OUT BECAUSE OF PATIENT'S DECISION FOR OTHER REASONS: ICD-10-CM

## 2022-09-13 DIAGNOSIS — R29.701 NIHSS SCORE 1: ICD-10-CM

## 2022-09-13 DIAGNOSIS — E78.5 HYPERLIPIDEMIA, UNSPECIFIED: ICD-10-CM

## 2022-09-13 DIAGNOSIS — R29.810 FACIAL WEAKNESS: ICD-10-CM

## 2022-09-13 DIAGNOSIS — R47.81 SLURRED SPEECH: ICD-10-CM

## 2022-09-13 DIAGNOSIS — E04.1 NONTOXIC SINGLE THYROID NODULE: ICD-10-CM

## 2022-09-13 DIAGNOSIS — E11.9 TYPE 2 DIABETES MELLITUS WITHOUT COMPLICATIONS: ICD-10-CM

## 2022-09-15 PROCEDURE — 70450 CT HEAD/BRAIN W/O DYE: CPT | Mod: MA

## 2022-09-15 PROCEDURE — 80053 COMPREHEN METABOLIC PANEL: CPT

## 2022-09-15 PROCEDURE — 84100 ASSAY OF PHOSPHORUS: CPT

## 2022-09-15 PROCEDURE — 84484 ASSAY OF TROPONIN QUANT: CPT

## 2022-09-15 PROCEDURE — 84443 ASSAY THYROID STIM HORMONE: CPT

## 2022-09-15 PROCEDURE — 85025 COMPLETE CBC W/AUTO DIFF WBC: CPT

## 2022-09-15 PROCEDURE — 70496 CT ANGIOGRAPHY HEAD: CPT | Mod: MA

## 2022-09-15 PROCEDURE — 85610 PROTHROMBIN TIME: CPT

## 2022-09-15 PROCEDURE — 80061 LIPID PANEL: CPT

## 2022-09-15 PROCEDURE — 97162 PT EVAL MOD COMPLEX 30 MIN: CPT

## 2022-09-15 PROCEDURE — 36415 COLL VENOUS BLD VENIPUNCTURE: CPT

## 2022-09-15 PROCEDURE — 87635 SARS-COV-2 COVID-19 AMP PRB: CPT

## 2022-09-15 PROCEDURE — 0042T: CPT | Mod: MA

## 2022-09-15 PROCEDURE — 80048 BASIC METABOLIC PNL TOTAL CA: CPT

## 2022-09-15 PROCEDURE — 86803 HEPATITIS C AB TEST: CPT

## 2022-09-15 PROCEDURE — 97161 PT EVAL LOW COMPLEX 20 MIN: CPT

## 2022-09-15 PROCEDURE — 83036 HEMOGLOBIN GLYCOSYLATED A1C: CPT

## 2022-09-15 PROCEDURE — 93306 TTE W/DOPPLER COMPLETE: CPT

## 2022-09-15 PROCEDURE — 70551 MRI BRAIN STEM W/O DYE: CPT

## 2022-09-15 PROCEDURE — 99285 EMERGENCY DEPT VISIT HI MDM: CPT | Mod: 25

## 2022-09-15 PROCEDURE — 70498 CT ANGIOGRAPHY NECK: CPT | Mod: MA

## 2022-09-15 PROCEDURE — 81003 URINALYSIS AUTO W/O SCOPE: CPT

## 2022-09-15 PROCEDURE — 85027 COMPLETE CBC AUTOMATED: CPT

## 2022-09-15 PROCEDURE — 83735 ASSAY OF MAGNESIUM: CPT

## 2022-09-15 PROCEDURE — 82962 GLUCOSE BLOOD TEST: CPT

## 2022-09-15 PROCEDURE — 85730 THROMBOPLASTIN TIME PARTIAL: CPT

## 2022-10-10 ENCOUNTER — NON-APPOINTMENT (OUTPATIENT)
Age: 67
End: 2022-10-10

## 2022-10-10 ENCOUNTER — APPOINTMENT (OUTPATIENT)
Dept: NEUROLOGY | Facility: CLINIC | Age: 67
End: 2022-10-10

## 2022-10-10 VITALS
BODY MASS INDEX: 25.27 KG/M2 | HEIGHT: 64 IN | SYSTOLIC BLOOD PRESSURE: 165 MMHG | HEART RATE: 73 BPM | TEMPERATURE: 98 F | WEIGHT: 148 LBS | OXYGEN SATURATION: 96 % | DIASTOLIC BLOOD PRESSURE: 94 MMHG

## 2022-10-10 DIAGNOSIS — E78.5 HYPERLIPIDEMIA, UNSPECIFIED: ICD-10-CM

## 2022-10-10 DIAGNOSIS — E11.9 TYPE 2 DIABETES MELLITUS W/OUT COMPLICATIONS: ICD-10-CM

## 2022-10-10 PROCEDURE — 99214 OFFICE O/P EST MOD 30 MIN: CPT

## 2022-10-10 RX ORDER — METOPROLOL TARTRATE 50 MG/1
50 TABLET, FILM COATED ORAL TWICE DAILY
Refills: 0 | Status: ACTIVE | COMMUNITY

## 2022-10-10 RX ORDER — ATORVASTATIN CALCIUM 80 MG/1
80 TABLET, FILM COATED ORAL
Refills: 0 | Status: ACTIVE | COMMUNITY

## 2022-10-10 NOTE — HISTORY OF PRESENT ILLNESS
[FreeTextEntry1] : The patient is a very pleasant 67-year-old gentleman with a history of hypertension, hyperlipidemia, newly diagnosed type 2 diabetes (A1c 7.1) who was admitted early September 2022 with transient episodes (3) of dysarthria, dizziness/disequilibrium, and ?  Left facial droop.  Each episode lasted only a few minutes before resolving.  CT head was unremarkable.  CTA showed no significant large vessel disease but did show slight irregularity of the basilar artery.  MRI was negative for acute ischemia.  TTE was unremarkable.  ZAYDA and ILR deferred.  Patient was dismissed on DAPT x21 days, now on aspirin alone as well as high intensity statin (.)  Given the stereotyped episodes, seizure was also potential explanation for symptoms for which an outpatient EEG was recommended.\par \par The patient denies any recurrent strokelike symptoms.  He is tolerating his medications well.  He has noted his blood pressure to be typically in the 160s during the day.  He takes amlodipine 5 mg, lisinopril 20 mg, and metoprolol 50 mg twice daily.

## 2022-10-10 NOTE — PHYSICAL EXAM
[FreeTextEntry1] : Alert.  Fully oriented.  Speech and language are intact.  Cranial nerves II-XII are intact.  Motor exam reveals intact strength with individual muscle testing in bilateral upper and lower extremities.Sensation is intact to light touch in distal extremities.  Finger-to-nose and heel-to-shin are intact.  Rapid alternating movements are normal in the upper and lower extremities.  Gait is normal.

## 2022-10-10 NOTE — ASSESSMENT
[FreeTextEntry1] : The patient is a very pleasant 67-year-old gentleman with a history of hypertension, hyperlipidemia, newly diagnosed type 2 diabetes (A1c 7.1) who was admitted early September 2022 with transient episodes (3) of dysarthria, dizziness/disequilibrium, and ?  Left facial droop.  The etiology of this was not entirely clear.  On the side of caution, we will continue to complete the patient's stroke work-up with a ZAYDA and 30-day cardiac monitor.  He is not interested in ILR.  He will continue aspirin, statin therapy for now.  I have ordered Accu metrics to ensure he is therapeutic on aspirin 81 mg daily.  We will increase his amlodipine given his persistently elevated blood pressures in the 160s.  He will continue to check them daily.  Routine EEG is ordered for evaluation of possible seizure.  Return to clinic in 3 months.

## 2022-10-18 ENCOUNTER — APPOINTMENT (OUTPATIENT)
Dept: NEUROLOGY | Facility: CLINIC | Age: 67
End: 2022-10-18

## 2022-10-18 PROCEDURE — 95819 EEG AWAKE AND ASLEEP: CPT

## 2023-01-27 ENCOUNTER — APPOINTMENT (OUTPATIENT)
Dept: NEUROLOGY | Facility: CLINIC | Age: 68
End: 2023-01-27

## 2023-02-01 ENCOUNTER — APPOINTMENT (OUTPATIENT)
Dept: NEUROLOGY | Facility: CLINIC | Age: 68
End: 2023-02-01
Payer: MEDICARE

## 2023-02-01 DIAGNOSIS — R40.4 TRANSIENT ALTERATION OF AWARENESS: ICD-10-CM

## 2023-02-01 PROCEDURE — 99212 OFFICE O/P EST SF 10 MIN: CPT | Mod: 95

## 2023-02-01 NOTE — HISTORY OF PRESENT ILLNESS
[FreeTextEntry1] : The patient is a very pleasant 68 year-old gentleman with a history of hypertension, hyperlipidemia, and type 2 diabetes who was admitted early September 2022 with transient episodes (3) of dysarthria, dizziness/disequilibrium, and ? Left facial droop (?TIA) with negative prior workup (including MRI, mild irregularity of basilar). He presents for follow-up.  \par \par The patient denies any recurrent strokelike symptoms. He is taking aspirin 81 mg daily and atorva 80 mg daily. He is tolerating his medications well. He reports he is following closely with his PCP for BP, HLD, and DM management. 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 He did not pursue ZAYDA or 30 day cardiac monitoring and is not interested in doing so. Routine EEG was negative. MRI repeated by PCP but results not yet relayed to patient.

## 2023-02-01 NOTE — ASSESSMENT
[FreeTextEntry1] : The patient is a very pleasant 68 year-old gentleman with a history of hypertension, hyperlipidemia, newly diagnosed type 2 diabetes (A1c 7.1) who was admitted early September 2022 with transient episodes (3) of dysarthria, dizziness/disequilibrium, and ? Left facial droop. The etiology of this was not entirely clear- ?TIA. He will continue ASA 81, atorva 80. He will continue to work with PCP for vascular risk factor reduction. \par \par  ZAYDA and 30 day cardiac monitor/ILR deferred by patient. He will continue aspirin, statin therapy for now. Ideally, he would have accumetrics and lipids checked by PCP. He should continue to work w his PCP for vascular risk factor control. F/U visit in 6 months, sooner if needed.  \par

## 2023-03-12 ENCOUNTER — EMERGENCY (EMERGENCY)
Facility: HOSPITAL | Age: 68
LOS: 1 days | Discharge: HOME | End: 2023-03-12
Attending: EMERGENCY MEDICINE | Admitting: EMERGENCY MEDICINE
Payer: MEDICARE

## 2023-03-12 VITALS
OXYGEN SATURATION: 98 % | SYSTOLIC BLOOD PRESSURE: 142 MMHG | DIASTOLIC BLOOD PRESSURE: 76 MMHG | HEART RATE: 68 BPM | RESPIRATION RATE: 18 BRPM | TEMPERATURE: 98 F

## 2023-03-12 VITALS
RESPIRATION RATE: 18 BRPM | WEIGHT: 149.91 LBS | OXYGEN SATURATION: 97 % | TEMPERATURE: 98 F | SYSTOLIC BLOOD PRESSURE: 150 MMHG | HEART RATE: 76 BPM | DIASTOLIC BLOOD PRESSURE: 94 MMHG

## 2023-03-12 DIAGNOSIS — M54.9 DORSALGIA, UNSPECIFIED: ICD-10-CM

## 2023-03-12 DIAGNOSIS — Z20.822 CONTACT WITH AND (SUSPECTED) EXPOSURE TO COVID-19: ICD-10-CM

## 2023-03-12 DIAGNOSIS — Z79.02 LONG TERM (CURRENT) USE OF ANTITHROMBOTICS/ANTIPLATELETS: ICD-10-CM

## 2023-03-12 DIAGNOSIS — Z79.82 LONG TERM (CURRENT) USE OF ASPIRIN: ICD-10-CM

## 2023-03-12 DIAGNOSIS — I10 ESSENTIAL (PRIMARY) HYPERTENSION: ICD-10-CM

## 2023-03-12 LAB
ALBUMIN SERPL ELPH-MCNC: 4.2 G/DL — SIGNIFICANT CHANGE UP (ref 3.3–5)
ALP SERPL-CCNC: 99 U/L — SIGNIFICANT CHANGE UP (ref 40–120)
ALT FLD-CCNC: 22 U/L — SIGNIFICANT CHANGE UP (ref 10–45)
ANION GAP SERPL CALC-SCNC: 10 MMOL/L — SIGNIFICANT CHANGE UP (ref 5–17)
APPEARANCE UR: CLEAR — SIGNIFICANT CHANGE UP
APTT BLD: 28.7 SEC — SIGNIFICANT CHANGE UP (ref 27.5–35.5)
AST SERPL-CCNC: 15 U/L — SIGNIFICANT CHANGE UP (ref 10–40)
BACTERIA # UR AUTO: SIGNIFICANT CHANGE UP /HPF
BASOPHILS # BLD AUTO: 0.05 K/UL — SIGNIFICANT CHANGE UP (ref 0–0.2)
BASOPHILS NFR BLD AUTO: 0.8 % — SIGNIFICANT CHANGE UP (ref 0–2)
BILIRUB SERPL-MCNC: 0.6 MG/DL — SIGNIFICANT CHANGE UP (ref 0.2–1.2)
BILIRUB UR-MCNC: NEGATIVE — SIGNIFICANT CHANGE UP
BUN SERPL-MCNC: 15 MG/DL — SIGNIFICANT CHANGE UP (ref 7–23)
CALCIUM SERPL-MCNC: 9.3 MG/DL — SIGNIFICANT CHANGE UP (ref 8.4–10.5)
CHLORIDE SERPL-SCNC: 106 MMOL/L — SIGNIFICANT CHANGE UP (ref 96–108)
CO2 SERPL-SCNC: 28 MMOL/L — SIGNIFICANT CHANGE UP (ref 22–31)
COLOR SPEC: YELLOW — SIGNIFICANT CHANGE UP
CREAT SERPL-MCNC: 0.76 MG/DL — SIGNIFICANT CHANGE UP (ref 0.5–1.3)
DIFF PNL FLD: NEGATIVE — SIGNIFICANT CHANGE UP
EGFR: 97 ML/MIN/1.73M2 — SIGNIFICANT CHANGE UP
EOSINOPHIL # BLD AUTO: 0.09 K/UL — SIGNIFICANT CHANGE UP (ref 0–0.5)
EOSINOPHIL NFR BLD AUTO: 1.4 % — SIGNIFICANT CHANGE UP (ref 0–6)
EPI CELLS # UR: SIGNIFICANT CHANGE UP /HPF (ref 0–5)
GLUCOSE SERPL-MCNC: 154 MG/DL — HIGH (ref 70–99)
GLUCOSE UR QL: 250
HCT VFR BLD CALC: 45.4 % — SIGNIFICANT CHANGE UP (ref 39–50)
HGB BLD-MCNC: 15.4 G/DL — SIGNIFICANT CHANGE UP (ref 13–17)
IMM GRANULOCYTES NFR BLD AUTO: 0.3 % — SIGNIFICANT CHANGE UP (ref 0–0.9)
INR BLD: 1.03 — SIGNIFICANT CHANGE UP (ref 0.88–1.16)
KETONES UR-MCNC: ABNORMAL MG/DL
LEUKOCYTE ESTERASE UR-ACNC: NEGATIVE — SIGNIFICANT CHANGE UP
LYMPHOCYTES # BLD AUTO: 0.96 K/UL — LOW (ref 1–3.3)
LYMPHOCYTES # BLD AUTO: 14.9 % — SIGNIFICANT CHANGE UP (ref 13–44)
MCHC RBC-ENTMCNC: 30.7 PG — SIGNIFICANT CHANGE UP (ref 27–34)
MCHC RBC-ENTMCNC: 33.9 GM/DL — SIGNIFICANT CHANGE UP (ref 32–36)
MCV RBC AUTO: 90.6 FL — SIGNIFICANT CHANGE UP (ref 80–100)
MONOCYTES # BLD AUTO: 0.41 K/UL — SIGNIFICANT CHANGE UP (ref 0–0.9)
MONOCYTES NFR BLD AUTO: 6.4 % — SIGNIFICANT CHANGE UP (ref 2–14)
NEUTROPHILS # BLD AUTO: 4.92 K/UL — SIGNIFICANT CHANGE UP (ref 1.8–7.4)
NEUTROPHILS NFR BLD AUTO: 76.2 % — SIGNIFICANT CHANGE UP (ref 43–77)
NITRITE UR-MCNC: NEGATIVE — SIGNIFICANT CHANGE UP
NRBC # BLD: 0 /100 WBCS — SIGNIFICANT CHANGE UP (ref 0–0)
PH UR: 6 — SIGNIFICANT CHANGE UP (ref 5–8)
PLATELET # BLD AUTO: 283 K/UL — SIGNIFICANT CHANGE UP (ref 150–400)
POTASSIUM SERPL-MCNC: 3.6 MMOL/L — SIGNIFICANT CHANGE UP (ref 3.5–5.3)
POTASSIUM SERPL-SCNC: 3.6 MMOL/L — SIGNIFICANT CHANGE UP (ref 3.5–5.3)
PROT SERPL-MCNC: 6.8 G/DL — SIGNIFICANT CHANGE UP (ref 6–8.3)
PROT UR-MCNC: 30 MG/DL
PROTHROM AB SERPL-ACNC: 12.3 SEC — SIGNIFICANT CHANGE UP (ref 10.5–13.4)
RBC # BLD: 5.01 M/UL — SIGNIFICANT CHANGE UP (ref 4.2–5.8)
RBC # FLD: 13.1 % — SIGNIFICANT CHANGE UP (ref 10.3–14.5)
RBC CASTS # UR COMP ASSIST: < 5 /HPF — SIGNIFICANT CHANGE UP
SARS-COV-2 RNA SPEC QL NAA+PROBE: SIGNIFICANT CHANGE UP
SODIUM SERPL-SCNC: 144 MMOL/L — SIGNIFICANT CHANGE UP (ref 135–145)
SP GR SPEC: 1.02 — SIGNIFICANT CHANGE UP (ref 1–1.03)
UROBILINOGEN FLD QL: 0.2 E.U./DL — SIGNIFICANT CHANGE UP
WBC # BLD: 6.45 K/UL — SIGNIFICANT CHANGE UP (ref 3.8–10.5)
WBC # FLD AUTO: 6.45 K/UL — SIGNIFICANT CHANGE UP (ref 3.8–10.5)
WBC UR QL: < 5 /HPF — SIGNIFICANT CHANGE UP

## 2023-03-12 PROCEDURE — 85730 THROMBOPLASTIN TIME PARTIAL: CPT

## 2023-03-12 PROCEDURE — 72131 CT LUMBAR SPINE W/O DYE: CPT | Mod: MA

## 2023-03-12 PROCEDURE — 81001 URINALYSIS AUTO W/SCOPE: CPT

## 2023-03-12 PROCEDURE — 96375 TX/PRO/DX INJ NEW DRUG ADDON: CPT

## 2023-03-12 PROCEDURE — 85610 PROTHROMBIN TIME: CPT

## 2023-03-12 PROCEDURE — 36415 COLL VENOUS BLD VENIPUNCTURE: CPT

## 2023-03-12 PROCEDURE — 85025 COMPLETE CBC W/AUTO DIFF WBC: CPT

## 2023-03-12 PROCEDURE — 93005 ELECTROCARDIOGRAM TRACING: CPT

## 2023-03-12 PROCEDURE — 80053 COMPREHEN METABOLIC PANEL: CPT

## 2023-03-12 PROCEDURE — 87086 URINE CULTURE/COLONY COUNT: CPT

## 2023-03-12 PROCEDURE — 99285 EMERGENCY DEPT VISIT HI MDM: CPT | Mod: 25

## 2023-03-12 PROCEDURE — 72131 CT LUMBAR SPINE W/O DYE: CPT | Mod: 26,MA

## 2023-03-12 PROCEDURE — 99285 EMERGENCY DEPT VISIT HI MDM: CPT

## 2023-03-12 PROCEDURE — 96365 THER/PROPH/DIAG IV INF INIT: CPT

## 2023-03-12 PROCEDURE — 87635 SARS-COV-2 COVID-19 AMP PRB: CPT

## 2023-03-12 RX ORDER — CYCLOBENZAPRINE HYDROCHLORIDE 10 MG/1
1 TABLET, FILM COATED ORAL
Qty: 30 | Refills: 0
Start: 2023-03-12 | End: 2023-03-21

## 2023-03-12 RX ORDER — DIAZEPAM 5 MG
5 TABLET ORAL ONCE
Refills: 0 | Status: DISCONTINUED | OUTPATIENT
Start: 2023-03-12 | End: 2023-03-12

## 2023-03-12 RX ORDER — ACETAMINOPHEN 500 MG
1000 TABLET ORAL ONCE
Refills: 0 | Status: COMPLETED | OUTPATIENT
Start: 2023-03-12 | End: 2023-03-12

## 2023-03-12 RX ORDER — KETOROLAC TROMETHAMINE 30 MG/ML
15 SYRINGE (ML) INJECTION ONCE
Refills: 0 | Status: DISCONTINUED | OUTPATIENT
Start: 2023-03-12 | End: 2023-03-12

## 2023-03-12 RX ADMIN — Medication 5 MILLIGRAM(S): at 16:15

## 2023-03-12 RX ADMIN — Medication 400 MILLIGRAM(S): at 16:16

## 2023-03-12 RX ADMIN — Medication 15 MILLIGRAM(S): at 16:16

## 2023-03-12 RX ADMIN — Medication 1000 MILLIGRAM(S): at 17:17

## 2023-03-12 RX ADMIN — Medication 15 MILLIGRAM(S): at 17:17

## 2023-03-12 NOTE — ED PROVIDER NOTE - CLINICAL SUMMARY MEDICAL DECISION MAKING FREE TEXT BOX
67 y/o M, PMHx of HTN, now here w/ new onset severe back pain. Suspect pain secondary to MSK cause. Consider slipped disc, compression fx due to recent lifting. No dilation of aorta noted on bedside US. Plan labs, CT spine and Toradol. Dispo pending, workup and reeval.

## 2023-03-12 NOTE — ED ADULT NURSE NOTE - OBJECTIVE STATEMENT
Patient c/o of sudden back pain, bilateral lower back, started 2 days ago when woke up and got out of bed, states pain sharp spasms worse when ambulating and with movement, no numbness/tingling, no bladder/bowel dysfunction, no neck tenderness or other neuro deficits.  States when walking today felt unsteady on legs due to pain.  Went to [facility] today and sent to ED.  Denies taking any pain medication PTA to ED.  PMHx  HTN, TIA.  Denies any fall or injury, states moved some heavy wood 1 week ago.

## 2023-03-12 NOTE — ED PROVIDER NOTE - PHYSICAL EXAMINATION
VITAL SIGNS: I have reviewed nursing notes and confirm.  CONSTITUTIONAL: Appears uncomfortable w/ movement; in no acute distress.  SKIN: Agree with RN documentation regarding decubitus evaluation. Remainder of skin exam is warm and dry, no acute rash.  HEAD: Normocephalic; atraumatic.  EYES: PERRL, EOM intact; conjunctiva and sclera clear.  ENT: No nasal discharge; airway clear.  NECK: Supple; non tender.  CARD: S1, S2 normal; no murmurs, gallops, or rubs. Regular rate and rhythm. Bedside US w/ no aortic dilation.   RESP: No wheezes, rales or rhonchi.  ABD: Normal bowel sounds; soft; non-distended; non-tender; no hepatosplenomegaly.  EXT: Normal ROM. No clubbing, cyanosis or edema.   MSK: No central spine tenderness. No tenderness to superficial lumbar muscles, although pain appreciated.   NEURO: Alert, oriented. Negative straight leg raise.   PSYCH: Cooperative, appropriate. VITAL SIGNS: I have reviewed nursing notes and confirm.  CONSTITUTIONAL: Appears uncomfortable w/ movement/ twisting / standing   SKIN: Agree with RN documentation regarding decubitus evaluation. Remainder of skin exam is warm and dry, no acute rash.  HEAD: Normocephalic; atraumatic.  EYES: PERRL, EOM intact; conjunctiva and sclera clear.  ENT: No nasal discharge; airway clear.  NECK: Supple; non tender.  CARD: S1, S2 normal; no murmurs, gallops, or rubs. Regular rate and rhythm. Bedside US w/ no aortic dilation.   RESP: No wheezes, rales or rhonchi.  ABD: Normal bowel sounds; soft; non-distended; non-tender; no hepatosplenomegaly.  EXT: Normal ROM. No clubbing, cyanosis or edema.   MSK: No central spine tenderness. No tenderness to superficial lumbar muscles, although pain apparent w pt moving. . Negative straight leg raise.   NEURO: Alert, oriented, nl sensation and strength .   PSYCH: Cooperative, appropriate.

## 2023-03-12 NOTE — ED ADULT NURSE REASSESSMENT NOTE - NS ED NURSE REASSESS COMMENT FT1
Pain meds adminsitered:  IV Ofirmev, Toradol IVP, Valium 5mg IVP.  Vital signs stble.  CT scan done;  results and disposition pending.

## 2023-03-12 NOTE — ED ADULT NURSE REASSESSMENT NOTE - NS ED NURSE REASSESS COMMENT FT1
All labs, CT scan resulted, back pain improved s/p meds, no new symptom complaint, able to ambulate with steady gait.  Vital signs stable. Discharged to home in stable condition.

## 2023-03-12 NOTE — ED ADULT NURSE NOTE - NSIMPLEMENTINTERV_GEN_ALL_ED
Implemented All Fall Risk Interventions:  [place] to call system. Call bell, personal items and telephone within reach. Instruct patient to call for assistance. Room bathroom lighting operational. Non-slip footwear when patient is off stretcher. Physically safe environment: no spills, clutter or unnecessary equipment. Stretcher in lowest position, wheels locked, appropriate side rails in place. Provide visual cue, wrist band, yellow gown, etc. Monitor gait and stability. Monitor for mental status changes and reorient to person, place, and time. Review medications for side effects contributing to fall risk. Reinforce activity limits and safety measures with patient and family.

## 2023-03-12 NOTE — ED PROVIDER NOTE - PATIENT PORTAL LINK FT
You can access the FollowMyHealth Patient Portal offered by [facility] by registering at the following website: [email or web address]. By joining [employer]’s FollowMyHealth portal, you will also be able to view your health information using other applications (apps) compatible with our system.

## 2023-03-12 NOTE — ED ADULT NURSE REASSESSMENT NOTE - NS ED NURSE REASSESS COMMENT FT1
Patient a/oX3, anxious, c/o of lower back pain, no numbness/tingling, no neuro deficits.  NSR on EKG, vital signs stable.  Right AC PIV #20 in place, all labs sent, no complications.  Fall precaution observed.

## 2023-03-12 NOTE — ED PROVIDER NOTE - OBJECTIVE STATEMENT
67 y/o M, PMHx of HTN, reports moving a large amount of fire wood 1 week ago, after he felt okay, developed back pain 2 days later, mild back pain which worsened this morning. Pt reports pain is worse w/ walking and twisting. Pt denies weakness, numbness, and bladder/bowel dysfunction. Pt denies any hx of cancer. Pt also reports pain is worse w/ significant attempt to stand. Pt was caught by son. Pt is using cane which is not his usual. No hx of vascular problems. Pt denies abdominal pain and chest pain.

## 2023-03-14 LAB
CULTURE RESULTS: SIGNIFICANT CHANGE UP
SPECIMEN SOURCE: SIGNIFICANT CHANGE UP

## 2023-06-09 ENCOUNTER — RX RENEWAL (OUTPATIENT)
Age: 68
End: 2023-06-09

## 2023-07-06 ENCOUNTER — APPOINTMENT (OUTPATIENT)
Dept: NEUROLOGY | Facility: CLINIC | Age: 68
End: 2023-07-06
Payer: MEDICARE

## 2023-07-06 VITALS
OXYGEN SATURATION: 95 % | BODY MASS INDEX: 25.1 KG/M2 | TEMPERATURE: 98.2 F | WEIGHT: 147 LBS | DIASTOLIC BLOOD PRESSURE: 80 MMHG | HEIGHT: 64 IN | HEART RATE: 81 BPM | SYSTOLIC BLOOD PRESSURE: 131 MMHG

## 2023-07-06 DIAGNOSIS — R47.89 OTHER SPEECH DISTURBANCES: ICD-10-CM

## 2023-07-06 PROBLEM — I10 ESSENTIAL (PRIMARY) HYPERTENSION: Chronic | Status: ACTIVE | Noted: 2023-03-12

## 2023-07-06 PROCEDURE — 99213 OFFICE O/P EST LOW 20 MIN: CPT

## 2023-07-06 NOTE — PHYSICAL EXAM
[FreeTextEntry1] : Alert.  Fully oriented.  Speech and language are intact.  Cranial nerves II-XII are intact.  Motor exam reveals intact strength with individual muscle testing in bilateral upper and lower extremities. Sensation is intact to light touch in distal extremities.  Finger-to-nose and heel-to-shin are intact.  Rapid alternating movements are normal in the upper and lower extremities.  Gait is normal.

## 2023-07-06 NOTE — ASSESSMENT
[FreeTextEntry1] : he patient is a very pleasant 68 year-old gentleman with a history of hypertension, hyperlipidemia, newly diagnosed type 2 diabetes (A1c 7.1) who was admitted early September 2022 with transient episodes (3) of dysarthria, dizziness/disequilibrium, and ? Left facial droop. The etiology of this was not entirely clear- ?TIA. He will continue ASA 81, atorva 80. He will continue to work with PCP for vascular risk factor reduction. f/u 1 year, sooner if needed.\par

## 2023-07-06 NOTE — HISTORY OF PRESENT ILLNESS
[FreeTextEntry1] : The patient is a very pleasant 68 year-old gentleman with a history of hypertension, hyperlipidemia, and type 2 diabetes who was admitted early September 2022 with transient episodes (3) of dysarthria, dizziness/disequilibrium, and ? Left facial droop (?TIA) with negative prior workup (including MRI, mild irregularity of basilar). He presents for follow-up. \par \par The patient denies any recurrent strokelike symptoms. He is taking aspirin 81 mg daily and atorva 80 mg daily. He is tolerating his medications well. He reports he is following closely with his PCP for BP, HLD, and DM management.

## 2023-09-08 NOTE — CONSULT NOTE ADULT - PROBLEM SELECTOR RECOMMENDATION 2
HbA1c 7.1%, Pt. previously diagnosed w/ prediabetes; discussed starting metformin w/ Pt., but he feels motivated to trial lifestyle modifications first; cont. diabetic diet, statin; suggest consulting Nutrition and Diabetes Educator; Pt. verbalized plan to f/u w/ his PCP for ongoing mgmt, including repeat HbA1c [FreeTextEntry1] : Received flu vaccine for [id number]\par

## 2024-01-05 NOTE — ED ADULT NURSE NOTE - INCIDENT LOCATION
"See my note from my visit today with recommendations.    The \"protrusions\" were normal bony structures.  " home

## 2024-07-03 ENCOUNTER — APPOINTMENT (OUTPATIENT)
Dept: NEUROLOGY | Facility: CLINIC | Age: 69
End: 2024-07-03